# Patient Record
Sex: FEMALE | Race: BLACK OR AFRICAN AMERICAN | URBAN - METROPOLITAN AREA
[De-identification: names, ages, dates, MRNs, and addresses within clinical notes are randomized per-mention and may not be internally consistent; named-entity substitution may affect disease eponyms.]

---

## 2022-06-16 ENCOUNTER — OFFICE VISIT (OUTPATIENT)
Dept: URBAN - METROPOLITAN AREA CLINIC 54 | Facility: CLINIC | Age: 22
End: 2022-06-16
Payer: COMMERCIAL

## 2022-06-16 ENCOUNTER — DASHBOARD ENCOUNTERS (OUTPATIENT)
Age: 22
End: 2022-06-16

## 2022-06-16 ENCOUNTER — WEB ENCOUNTER (OUTPATIENT)
Dept: URBAN - METROPOLITAN AREA CLINIC 54 | Facility: CLINIC | Age: 22
End: 2022-06-16

## 2022-06-16 ENCOUNTER — TELEPHONE ENCOUNTER (OUTPATIENT)
Dept: URBAN - METROPOLITAN AREA CLINIC 54 | Facility: CLINIC | Age: 22
End: 2022-06-16

## 2022-06-16 VITALS
WEIGHT: 181 LBS | TEMPERATURE: 98 F | SYSTOLIC BLOOD PRESSURE: 107 MMHG | HEIGHT: 59 IN | DIASTOLIC BLOOD PRESSURE: 64 MMHG | BODY MASS INDEX: 36.49 KG/M2 | HEART RATE: 75 BPM

## 2022-06-16 DIAGNOSIS — Z90.49 STATUS POST CHOLECYSTECTOMY: ICD-10-CM

## 2022-06-16 DIAGNOSIS — R79.89 ELEVATED LIVER FUNCTION TESTS: ICD-10-CM

## 2022-06-16 DIAGNOSIS — K91.5 POST-CHOLECYSTECTOMY SYNDROME: ICD-10-CM

## 2022-06-16 DIAGNOSIS — K80.50 BILIARY COLIC: ICD-10-CM

## 2022-06-16 DIAGNOSIS — R10.11 POSTPRANDIAL ABDOMINAL PAIN IN RIGHT UPPER QUADRANT: ICD-10-CM

## 2022-06-16 PROCEDURE — 99203 OFFICE O/P NEW LOW 30 MIN: CPT | Performed by: INTERNAL MEDICINE

## 2022-06-16 RX ORDER — ESCITALOPRAM OXALATE 20 MG/1
1 TABLET TABLET ORAL ONCE A DAY
Status: ACTIVE | COMMUNITY

## 2022-06-16 NOTE — PHYSICAL EXAM LYMPHATIC:
Neck , no lymphadenopathy
Detail Level: Detailed
General Sunscreen Counseling: I recommended a broad spectrum sunscreen with a SPF of 30 or higher.  I explained that SPF 30 sunscreens block approximately 97 percent of the sun's harmful rays.  Sunscreens should be applied at least 15 minutes prior to expected sun exposure and then every 2 hours after that as long as sun exposure continues. If swimming or exercising sunscreen should be reapplied every 45 minutes to an hour after getting wet or sweating.  One ounce, or the equivalent of a shot glass full of sunscreen, is adequate to protect the skin not covered by a bathing suit. I also recommended a lip balm with a sunscreen as well. Sun protective clothing can be used in lieu of sunscreen but must be worn the entire time you are exposed to the sun's rays.

## 2022-06-16 NOTE — HPI-TODAY'S VISIT:
21-year-old lady with symptomatic cholelithiasis status postcholecystectomy in September 2021 presents with recurrent right upper quadrant abdominal pain that is postprandial.  Patient reports these symptoms started 2 months ago and feels similar to prior gallbladder attacks.  She reports that after eating she develops right upper quadrant pain that radiates to her back with associated nausea.  This is becoming progressive and the patient is concerned that she is suffering from the same problem.  The patient was initially evaluated for her gallbladder she was found to have elevated liver function tests with an   and alk phos of 284 with a normal bilirubin.  MRCP performed in September 2021 was read as negative for a common bile duct stone.  However I reviewed this film with another radiologist Dr. Lovelace and we both agree that there is a small filling defect in the mid common bile duct.

## 2022-06-17 LAB
ALBUMIN: 4.5
ALKALINE PHOSPHATASE: 64
ALT (SGPT): 47
AST (SGOT): 26
BILIRUBIN, DIRECT: <0.1
BILIRUBIN, TOTAL: <0.2
GGT: 23
PROTEIN, TOTAL: 7.4

## 2022-07-12 ENCOUNTER — OFFICE VISIT (OUTPATIENT)
Dept: URBAN - METROPOLITAN AREA MEDICAL CENTER 23 | Facility: MEDICAL CENTER | Age: 22
End: 2022-07-12
Payer: COMMERCIAL

## 2022-07-12 DIAGNOSIS — Z12.11 COLON CANCER SCREENING: ICD-10-CM

## 2022-07-12 PROCEDURE — 992 APS NON BILLABLE: Performed by: INTERNAL MEDICINE

## 2022-07-12 RX ORDER — ESCITALOPRAM OXALATE 20 MG/1
1 TABLET TABLET ORAL ONCE A DAY
Status: ACTIVE | COMMUNITY

## 2022-08-03 ENCOUNTER — TELEPHONE ENCOUNTER (OUTPATIENT)
Dept: URBAN - METROPOLITAN AREA CLINIC 54 | Facility: CLINIC | Age: 22
End: 2022-08-03

## 2022-08-03 PROBLEM — 428882003: Status: ACTIVE | Noted: 2022-06-16

## 2022-08-04 ENCOUNTER — OFFICE VISIT (OUTPATIENT)
Dept: URBAN - METROPOLITAN AREA MEDICAL CENTER 23 | Facility: MEDICAL CENTER | Age: 22
End: 2022-08-04
Payer: COMMERCIAL

## 2022-08-04 DIAGNOSIS — R93.2 ABN FIND-BILIARY TRACT: ICD-10-CM

## 2022-08-04 DIAGNOSIS — R10.11 ABDOMINAL BURNING SENSATION IN RIGHT UPPER QUADRANT: ICD-10-CM

## 2022-08-04 DIAGNOSIS — K29.60 ADENOPAPILLOMATOSIS GASTRICA: ICD-10-CM

## 2022-08-04 PROCEDURE — 43239 EGD BIOPSY SINGLE/MULTIPLE: CPT | Performed by: INTERNAL MEDICINE

## 2022-08-04 PROCEDURE — 43259 EGD US EXAM DUODENUM/JEJUNUM: CPT | Performed by: INTERNAL MEDICINE

## 2022-08-04 RX ORDER — ESCITALOPRAM OXALATE 20 MG/1
1 TABLET TABLET ORAL ONCE A DAY
Status: ACTIVE | COMMUNITY

## 2022-08-05 ENCOUNTER — TELEPHONE ENCOUNTER (OUTPATIENT)
Dept: URBAN - METROPOLITAN AREA CLINIC 54 | Facility: CLINIC | Age: 22
End: 2022-08-05

## 2022-10-17 ENCOUNTER — OFFICE VISIT (OUTPATIENT)
Dept: URBAN - METROPOLITAN AREA TELEHEALTH 2 | Facility: TELEHEALTH | Age: 22
End: 2022-10-17